# Patient Record
Sex: FEMALE | URBAN - METROPOLITAN AREA
[De-identification: names, ages, dates, MRNs, and addresses within clinical notes are randomized per-mention and may not be internally consistent; named-entity substitution may affect disease eponyms.]

---

## 2021-06-02 ENCOUNTER — SKIN CANCER EXAM (OUTPATIENT)
Dept: URBAN - METROPOLITAN AREA CLINIC 36 | Facility: CLINIC | Age: 36
Setting detail: DERMATOLOGY
End: 2021-06-02

## 2021-06-02 DIAGNOSIS — L57.0 ACTINIC KERATOSIS: ICD-10-CM

## 2021-06-02 PROBLEM — D18.01 HEMANGIOMA OF SKIN AND SUBCUTANEOUS TISSUE: Status: RESOLVED | Noted: 2021-06-02

## 2021-06-02 PROBLEM — D18.01 HEMANGIOMA OF SKIN AND SUBCUTANEOUS TISSUE: Status: ACTIVE | Noted: 2021-06-02

## 2021-06-02 PROCEDURE — 99204 OFFICE O/P NEW MOD 45 MIN: CPT

## 2021-09-02 ENCOUNTER — FOLLOW UP (OUTPATIENT)
Dept: URBAN - METROPOLITAN AREA CLINIC 36 | Facility: CLINIC | Age: 36
Setting detail: DERMATOLOGY
End: 2021-09-02

## 2021-09-02 DIAGNOSIS — C44.629 SQUAMOUS CELL CARCINOMA OF SKIN OF LEFT UPPER LIMB, INCLUDING SHOULDER: ICD-10-CM

## 2021-09-02 PROCEDURE — 99212 OFFICE O/P EST SF 10 MIN: CPT

## 2024-12-13 ENCOUNTER — OFFICE VISIT (OUTPATIENT)
Dept: URBAN - METROPOLITAN AREA CLINIC 105 | Facility: CLINIC | Age: 39
End: 2024-12-13
Payer: COMMERCIAL

## 2024-12-13 ENCOUNTER — DASHBOARD ENCOUNTERS (OUTPATIENT)
Age: 39
End: 2024-12-13

## 2024-12-13 VITALS
HEIGHT: 63 IN | BODY MASS INDEX: 33.98 KG/M2 | HEART RATE: 69 BPM | SYSTOLIC BLOOD PRESSURE: 117 MMHG | WEIGHT: 191.8 LBS | DIASTOLIC BLOOD PRESSURE: 74 MMHG | TEMPERATURE: 97.1 F

## 2024-12-13 DIAGNOSIS — R10.11 RUQ PAIN: ICD-10-CM

## 2024-12-13 PROCEDURE — 99204 OFFICE O/P NEW MOD 45 MIN: CPT | Performed by: INTERNAL MEDICINE

## 2024-12-13 RX ORDER — HYOSCYAMINE SULFATE 0.12 MG/1
1 TABLET TABLET SUBLINGUAL
Qty: 15 | Refills: 2 | OUTPATIENT
Start: 2024-12-13 | End: 2025-01-27

## 2024-12-13 RX ORDER — MELOXICAM 10 MG/1
1 CAPSULE CAPSULE ORAL ONCE A DAY
Status: ACTIVE | COMMUNITY

## 2024-12-13 NOTE — HPI-TODAY'S VISIT:
The patient presents for RUQ pain.  Today, the patient says in the first week of Aug, she woke up with intense RUQ pain. By 6-7 am, the pain worsened and started to have chills, nausea due to the pain. She went to  - says NP thought it was a "stomach virus," rx'd an antinausea med and ibuprofen 800 mg. She took ibuprofen for a couple of days and got relief. Pain returned in the 3rd week of Sept, so she saw her PCP. Ultrasound (ordered by PCP) was normal. This second episode lasted a day, resolved w/ ibuprofen. She was told by her PCP to switch to Tylenol for pain. She has not had the pain since then. She denies diarrhea. No nausea with the second episode.  She is adopted, no known FHx Social history: Former marijuana user - last use 3-4 months ago

## 2024-12-26 ENCOUNTER — ERX REFILL RESPONSE (OUTPATIENT)
Dept: URBAN - METROPOLITAN AREA CLINIC 105 | Facility: CLINIC | Age: 39
End: 2024-12-26

## 2024-12-26 RX ORDER — HYOSCYAMINE SULFATE 0.12 MG/1
1 TABLET TABLET SUBLINGUAL
Qty: 15 | Refills: 2 | OUTPATIENT

## 2024-12-26 RX ORDER — HYOSCYAMINE SULFATE 0.12 MG/1
1 TABLET TABLET SUBLINGUAL
Qty: 90 | Refills: 1 | OUTPATIENT

## 2025-04-29 ENCOUNTER — OFFICE VISIT (OUTPATIENT)
Dept: URBAN - METROPOLITAN AREA CLINIC 105 | Facility: CLINIC | Age: 40
End: 2025-04-29
Payer: COMMERCIAL

## 2025-04-29 DIAGNOSIS — R93.5 ABNORMAL ABDOMINAL CT SCAN: ICD-10-CM

## 2025-04-29 DIAGNOSIS — R10.11 RUQ PAIN: ICD-10-CM

## 2025-04-29 PROCEDURE — 99213 OFFICE O/P EST LOW 20 MIN: CPT | Performed by: INTERNAL MEDICINE

## 2025-04-29 RX ORDER — MELOXICAM 10 MG/1
1 CAPSULE CAPSULE ORAL ONCE A DAY
Status: ON HOLD | COMMUNITY

## 2025-04-29 RX ORDER — HYOSCYAMINE SULFATE 0.12 MG/1
1 TABLET TABLET SUBLINGUAL
Qty: 90 | Refills: 1 | Status: ON HOLD | COMMUNITY

## 2025-04-29 NOTE — HPI-TODAY'S VISIT:
The patient presents in follow-up for RUQ pain.  On 12/13/24, the patient said in the first week of Aug, she woke up with intense RUQ pain. By 6-7 am, the pain worsened and started to have chills, nausea due to the pain. She went to  - said NP thought it was a "stomach virus," rx'd an antinausea med and ibuprofen 800 mg. She took ibuprofen for a couple of days and got relief. Pain returned in the 3rd week of Sept, so she saw her PCP. Ultrasound (ordered by PCP) was normal. This second episode lasted a day, resolved w/ ibuprofen. She was told by her PCP to switch to Tylenol for pain. She had not had the pain since then. She denied diarrhea. No nausea with the second episode.  She is adopted, no known FHx Social history: Former marijuana user - last use 3-4 months ago  HPI Today, she says she had presented to the ER in earlier this month for mid right sided discomfort followed by diarrhea, lasting part of 1 day. Her pain significantly decreased in the ER. She was discharged with abx for 5 days for possible appendicitis. During and after abx treatment, her pain is described as a 1-2/10, same as it was in the ER. Diarrhea only lasted for a few hours on the day of ER visit. Some abd discomfort today, no diarrhea. She is eating okay.  Labs 4/25/25 - CBC, CMP, TSH all normal. 4/15/25 - CBC normal except WBC 11.4. CBC, lipase all normal. 9/25/24 - CBC, CMP, lipase all normal. 10/6/21 - CMP normal except AST 40. CBC, INR all normal.